# Patient Record
Sex: FEMALE | Race: WHITE | NOT HISPANIC OR LATINO | Employment: FULL TIME | URBAN - METROPOLITAN AREA
[De-identification: names, ages, dates, MRNs, and addresses within clinical notes are randomized per-mention and may not be internally consistent; named-entity substitution may affect disease eponyms.]

---

## 2024-01-26 ASSESSMENT — PROMIS GLOBAL HEALTH SCALE
EMOTIONAL_PROBLEMS: SOMETIMES
RATE_AVERAGE_PAIN: 2
RATE_AVERAGE_FATIGUE: MILD
RATE_PHYSICAL_HEALTH: GOOD
RATE_GENERAL_HEALTH: VERY GOOD
CARRYOUT_SOCIAL_ACTIVITIES: EXCELLENT
CARRYOUT_PHYSICAL_ACTIVITIES: COMPLETELY
RATE_QUALITY_OF_LIFE: VERY GOOD
RATE_MENTAL_HEALTH: VERY GOOD
RATE_SOCIAL_SATISFACTION: VERY GOOD

## 2024-01-31 ENCOUNTER — LAB (OUTPATIENT)
Dept: LAB | Facility: LAB | Age: 39
End: 2024-01-31
Payer: COMMERCIAL

## 2024-01-31 ENCOUNTER — OFFICE VISIT (OUTPATIENT)
Dept: PRIMARY CARE | Facility: CLINIC | Age: 39
End: 2024-01-31
Payer: COMMERCIAL

## 2024-01-31 VITALS
HEIGHT: 67 IN | BODY MASS INDEX: 25.27 KG/M2 | TEMPERATURE: 98.1 F | SYSTOLIC BLOOD PRESSURE: 120 MMHG | WEIGHT: 161 LBS | DIASTOLIC BLOOD PRESSURE: 78 MMHG

## 2024-01-31 DIAGNOSIS — Z23 NEED FOR DIPHTHERIA-TETANUS-PERTUSSIS (TDAP) VACCINE: ICD-10-CM

## 2024-01-31 DIAGNOSIS — R79.89 LOW VITAMIN D LEVEL: ICD-10-CM

## 2024-01-31 DIAGNOSIS — G89.29 CHRONIC PAIN OF RIGHT KNEE: ICD-10-CM

## 2024-01-31 DIAGNOSIS — M76.60 PAIN IN ACHILLES TENDON: ICD-10-CM

## 2024-01-31 DIAGNOSIS — Z13.29 SCREENING FOR THYROID DISORDER: ICD-10-CM

## 2024-01-31 DIAGNOSIS — Z00.00 HEALTHCARE MAINTENANCE: Primary | ICD-10-CM

## 2024-01-31 DIAGNOSIS — Z00.00 HEALTHCARE MAINTENANCE: ICD-10-CM

## 2024-01-31 DIAGNOSIS — M25.561 CHRONIC PAIN OF RIGHT KNEE: ICD-10-CM

## 2024-01-31 PROBLEM — R53.81 MALAISE AND FATIGUE: Status: ACTIVE | Noted: 2024-01-31

## 2024-01-31 PROBLEM — H54.61 VISION LOSS OF RIGHT EYE: Status: ACTIVE | Noted: 2024-01-31

## 2024-01-31 PROBLEM — G45.9 TIA (TRANSIENT ISCHEMIC ATTACK): Status: ACTIVE | Noted: 2024-01-31

## 2024-01-31 PROBLEM — N94.11 SUPERFICIAL DYSPAREUNIA: Status: ACTIVE | Noted: 2023-11-09

## 2024-01-31 PROBLEM — R10.2 PELVIC PAIN IN FEMALE: Status: ACTIVE | Noted: 2023-09-20

## 2024-01-31 PROBLEM — N80.129 ENDOMETRIOMA OF OVARY: Status: ACTIVE | Noted: 2024-01-31

## 2024-01-31 PROBLEM — M62.838 SPASM OF MUSCLE: Status: ACTIVE | Noted: 2023-09-20

## 2024-01-31 PROBLEM — N83.8 OVARIAN MASS, LEFT: Status: ACTIVE | Noted: 2024-01-31

## 2024-01-31 PROBLEM — G45.9 TRANSIENT ISCHEMIC ATTACK: Status: ACTIVE | Noted: 2024-01-31

## 2024-01-31 PROBLEM — M25.50 ARTHRALGIA: Status: ACTIVE | Noted: 2024-01-31

## 2024-01-31 PROBLEM — N83.8 MASS OF LEFT OVARY: Status: ACTIVE | Noted: 2024-01-31

## 2024-01-31 PROBLEM — M25.562 PAIN IN LEFT KNEE: Status: ACTIVE | Noted: 2024-01-31

## 2024-01-31 PROBLEM — N80.9 ENDOMETRIOSIS: Status: ACTIVE | Noted: 2024-01-31

## 2024-01-31 PROBLEM — J32.9 SINUSITIS: Status: ACTIVE | Noted: 2024-01-31

## 2024-01-31 PROBLEM — N80.9 ENDOMETRIOSIS, ADHESIVE: Status: ACTIVE | Noted: 2024-01-31

## 2024-01-31 PROBLEM — R10.9 FLANK PAIN: Status: ACTIVE | Noted: 2024-01-31

## 2024-01-31 PROBLEM — R76.8 POSITIVE ANTINUCLEAR ANTIBODY: Status: ACTIVE | Noted: 2024-01-31

## 2024-01-31 PROBLEM — L80 VITILIGO: Status: ACTIVE | Noted: 2024-01-31

## 2024-01-31 PROBLEM — R53.83 MALAISE AND FATIGUE: Status: ACTIVE | Noted: 2024-01-31

## 2024-01-31 PROBLEM — R11.2 PONV (POSTOPERATIVE NAUSEA AND VOMITING): Status: ACTIVE | Noted: 2023-07-06

## 2024-01-31 PROBLEM — R76.8 ANA POSITIVE: Status: ACTIVE | Noted: 2024-01-31

## 2024-01-31 PROBLEM — H53.9 VISUAL DISTURBANCE: Status: ACTIVE | Noted: 2024-01-31

## 2024-01-31 PROBLEM — R25.2 MUSCLE CRAMPS: Status: ACTIVE | Noted: 2024-01-31

## 2024-01-31 PROBLEM — H54.7 VISUAL IMPAIRMENT: Status: ACTIVE | Noted: 2024-01-31

## 2024-01-31 PROBLEM — Z98.890 PONV (POSTOPERATIVE NAUSEA AND VOMITING): Status: ACTIVE | Noted: 2023-07-06

## 2024-01-31 PROBLEM — G43.109 OCULAR MIGRAINE: Status: ACTIVE | Noted: 2024-01-31

## 2024-01-31 PROBLEM — G43.109 OPHTHALMIC MIGRAINE: Status: ACTIVE | Noted: 2024-01-31

## 2024-01-31 PROBLEM — R10.9 PAIN IN THE ABDOMEN: Status: ACTIVE | Noted: 2024-01-31

## 2024-01-31 LAB
25(OH)D3 SERPL-MCNC: 24 NG/ML (ref 30–100)
ALBUMIN SERPL BCP-MCNC: 4.5 G/DL (ref 3.4–5)
ALP SERPL-CCNC: 46 U/L (ref 33–110)
ALT SERPL W P-5'-P-CCNC: 13 U/L (ref 7–45)
ANION GAP SERPL CALC-SCNC: 13 MMOL/L (ref 10–20)
AST SERPL W P-5'-P-CCNC: 19 U/L (ref 9–39)
BILIRUB SERPL-MCNC: 0.5 MG/DL (ref 0–1.2)
BUN SERPL-MCNC: 14 MG/DL (ref 6–23)
CALCIUM SERPL-MCNC: 9.3 MG/DL (ref 8.6–10.3)
CHLORIDE SERPL-SCNC: 104 MMOL/L (ref 98–107)
CHOLEST SERPL-MCNC: 199 MG/DL (ref 0–199)
CHOLESTEROL/HDL RATIO: 3.3
CO2 SERPL-SCNC: 27 MMOL/L (ref 21–32)
CREAT SERPL-MCNC: 0.76 MG/DL (ref 0.5–1.05)
EGFRCR SERPLBLD CKD-EPI 2021: >90 ML/MIN/1.73M*2
ERYTHROCYTE [DISTWIDTH] IN BLOOD BY AUTOMATED COUNT: 12.1 % (ref 11.5–14.5)
GLUCOSE SERPL-MCNC: 93 MG/DL (ref 74–99)
HCT VFR BLD AUTO: 42.4 % (ref 36–46)
HDLC SERPL-MCNC: 61 MG/DL
HGB BLD-MCNC: 14.1 G/DL (ref 12–16)
LDLC SERPL CALC-MCNC: 112 MG/DL
MCH RBC QN AUTO: 34.4 PG (ref 26–34)
MCHC RBC AUTO-ENTMCNC: 33.3 G/DL (ref 32–36)
MCV RBC AUTO: 103 FL (ref 80–100)
NON HDL CHOLESTEROL: 138 MG/DL (ref 0–149)
NRBC BLD-RTO: 0 /100 WBCS (ref 0–0)
PLATELET # BLD AUTO: 241 X10*3/UL (ref 150–450)
POTASSIUM SERPL-SCNC: 4.3 MMOL/L (ref 3.5–5.3)
PROT SERPL-MCNC: 7.3 G/DL (ref 6.4–8.2)
RBC # BLD AUTO: 4.1 X10*6/UL (ref 4–5.2)
SODIUM SERPL-SCNC: 140 MMOL/L (ref 136–145)
TRIGL SERPL-MCNC: 129 MG/DL (ref 0–149)
TSH SERPL-ACNC: 1.69 MIU/L (ref 0.44–3.98)
VIT B12 SERPL-MCNC: 369 PG/ML (ref 211–911)
VLDL: 26 MG/DL (ref 0–40)
WBC # BLD AUTO: 6.6 X10*3/UL (ref 4.4–11.3)

## 2024-01-31 PROCEDURE — 90715 TDAP VACCINE 7 YRS/> IM: CPT | Performed by: NURSE PRACTITIONER

## 2024-01-31 PROCEDURE — 90471 IMMUNIZATION ADMIN: CPT | Performed by: NURSE PRACTITIONER

## 2024-01-31 PROCEDURE — 1036F TOBACCO NON-USER: CPT | Performed by: NURSE PRACTITIONER

## 2024-01-31 PROCEDURE — 80061 LIPID PANEL: CPT

## 2024-01-31 PROCEDURE — 36415 COLL VENOUS BLD VENIPUNCTURE: CPT

## 2024-01-31 PROCEDURE — 85027 COMPLETE CBC AUTOMATED: CPT

## 2024-01-31 PROCEDURE — 82306 VITAMIN D 25 HYDROXY: CPT

## 2024-01-31 PROCEDURE — 84443 ASSAY THYROID STIM HORMONE: CPT

## 2024-01-31 PROCEDURE — 80053 COMPREHEN METABOLIC PANEL: CPT

## 2024-01-31 PROCEDURE — 82607 VITAMIN B-12: CPT

## 2024-01-31 PROCEDURE — 99396 PREV VISIT EST AGE 40-64: CPT | Performed by: NURSE PRACTITIONER

## 2024-01-31 RX ORDER — NAPROXEN 250 MG/1
250 TABLET ORAL
COMMUNITY

## 2024-01-31 RX ORDER — ACETAMINOPHEN 500 MG
2000 TABLET ORAL DAILY
COMMUNITY

## 2024-01-31 ASSESSMENT — PATIENT HEALTH QUESTIONNAIRE - PHQ9
SUM OF ALL RESPONSES TO PHQ9 QUESTIONS 1 AND 2: 0
1. LITTLE INTEREST OR PLEASURE IN DOING THINGS: NOT AT ALL
2. FEELING DOWN, DEPRESSED OR HOPELESS: NOT AT ALL

## 2024-01-31 NOTE — PATIENT INSTRUCTIONS
Nice to see you today.  Fasting labs and I will follow up.  REFER to [facility]TH[facility] - you can schedule by calling [phone]  Tetanus and pertussis booster.  This is good for 10 years.  Keep up the good work taking care of yourself.

## 2024-01-31 NOTE — PROGRESS NOTES
"Subjective   Patient ID: Lala Nash is a 38 y.o. female who presents for Annual Exam (CPX).    HPI   Here for CPX today  Last summer had left ovary removed & endometriosis surgery    Was a long recovery.  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  Sees GYN regularly    \"Trying to get back on track\"    Bikes four times per week.  Water - not great 3-4 cups per day  Caffeine 1 Energy drink per day  Sleep has been bad for a while    Right knee locked up when getting up from the ground.  2-3 days lots of pain and trouble   Then saw ROSENDO Rivero and never followed up   Still a problem  Also has issue with both Achilles up into calf - only happens at night.  This started a year ago.  No restless leg symptoms.    Does do some stretching.    Thanksgiving 2023  Had COVID  Hx Vitiligo - sees DERM monthly for injections ([facility])    No idea when last tetanus was.    Review of Systems   Musculoskeletal:  Positive for gait problem.   All other systems reviewed and are negative.      Objective   /78   Temp 36.7 °C (98.1 °F)   Ht 1.702 m (5' 7\")   Wt 73 kg (161 lb)   BMI 25.22 kg/m²     Physical Exam  Vitals and nursing note reviewed.   Constitutional:       Appearance: Normal appearance.   HENT:      Head: Normocephalic and atraumatic.      Right Ear: Tympanic membrane, ear canal and external ear normal.      Left Ear: Tympanic membrane, ear canal and external ear normal.      Nose: Nose normal.      Mouth/Throat:      Pharynx: Oropharynx is clear.   Eyes:      Pupils: Pupils are equal, round, and reactive to light.   Neck:      Thyroid: No thyroid mass, thyromegaly or thyroid tenderness.   Cardiovascular:      Rate and Rhythm: Normal rate and regular rhythm.      Pulses: Normal pulses.      Heart sounds: Normal heart sounds.   Pulmonary:      Effort: Pulmonary effort is normal.      Breath sounds: Normal breath sounds.   Abdominal:      General: Bowel sounds are normal.    " "  Palpations: Abdomen is soft.   Genitourinary:     Comments: Defer  Musculoskeletal:         General: Tenderness present.      Cervical back: Normal range of motion and neck supple.      Comments: KAMINI  posterior achilles \"tender\"     Skin:     General: Skin is warm.      Comments: Vitiligo noted on hands, upper extremities, legs.   Neurological:      Mental Status: She is alert and oriented to person, place, and time.   Psychiatric:         Mood and Affect: Mood normal.         Behavior: Behavior normal.         Thought Content: Thought content normal.         Judgment: Judgment normal.         Assessment/Plan   Problem List Items Addressed This Visit             ICD-10-CM    Chronic pain of right knee M25.561, G89.29    Relevant Orders    Referral to Orthopaedic Surgery    Low vitamin D level R79.89    Relevant Orders    Vitamin D 25-Hydroxy,Total (for eval of Vitamin D levels) (Completed)    Need for diphtheria-tetanus-pertussis (Tdap) vaccine Z23    Relevant Orders    Tdap vaccine, age 7 years and older (Completed)    Pain in Achilles tendon M76.60    Relevant Orders    Referral to Orthopaedic Surgery    Vitamin B12 (Completed)    Screening for thyroid disorder - Primary Z13.29    Relevant Orders    TSH with reflex to Free T4 if abnormal (Completed)          "

## 2024-12-16 ENCOUNTER — APPOINTMENT (OUTPATIENT)
Dept: PRIMARY CARE | Facility: CLINIC | Age: 39
End: 2024-12-16
Payer: COMMERCIAL

## 2025-01-22 ENCOUNTER — LAB (OUTPATIENT)
Dept: LAB | Facility: LAB | Age: 40
End: 2025-01-22
Payer: COMMERCIAL

## 2025-01-22 ENCOUNTER — APPOINTMENT (OUTPATIENT)
Dept: PRIMARY CARE | Facility: CLINIC | Age: 40
End: 2025-01-22
Payer: COMMERCIAL

## 2025-01-22 VITALS
HEART RATE: 78 BPM | WEIGHT: 161 LBS | BODY MASS INDEX: 25.27 KG/M2 | TEMPERATURE: 98.1 F | DIASTOLIC BLOOD PRESSURE: 80 MMHG | OXYGEN SATURATION: 98 % | HEIGHT: 67 IN | SYSTOLIC BLOOD PRESSURE: 116 MMHG

## 2025-01-22 DIAGNOSIS — Z12.31 ENCOUNTER FOR SCREENING MAMMOGRAM FOR MALIGNANT NEOPLASM OF BREAST: ICD-10-CM

## 2025-01-22 DIAGNOSIS — Z00.00 HEALTH CARE MAINTENANCE: ICD-10-CM

## 2025-01-22 DIAGNOSIS — Z13.29 SCREENING FOR THYROID DISORDER: ICD-10-CM

## 2025-01-22 DIAGNOSIS — F41.8 SITUATIONAL ANXIETY: ICD-10-CM

## 2025-01-22 DIAGNOSIS — Z13.21 ENCOUNTER FOR VITAMIN DEFICIENCY SCREENING: ICD-10-CM

## 2025-01-22 DIAGNOSIS — Z00.00 HEALTH CARE MAINTENANCE: Primary | ICD-10-CM

## 2025-01-22 DIAGNOSIS — Z13.1 SCREENING FOR DIABETES MELLITUS: ICD-10-CM

## 2025-01-22 LAB
25(OH)D3 SERPL-MCNC: 14 NG/ML (ref 30–100)
ALBUMIN SERPL BCP-MCNC: 4.6 G/DL (ref 3.4–5)
ALP SERPL-CCNC: 50 U/L (ref 33–110)
ALT SERPL W P-5'-P-CCNC: 12 U/L (ref 7–45)
ANION GAP SERPL CALC-SCNC: 9 MMOL/L (ref 10–20)
AST SERPL W P-5'-P-CCNC: 16 U/L (ref 9–39)
BILIRUB SERPL-MCNC: 0.4 MG/DL (ref 0–1.2)
BUN SERPL-MCNC: 14 MG/DL (ref 6–23)
CALCIUM SERPL-MCNC: 9.4 MG/DL (ref 8.6–10.3)
CHLORIDE SERPL-SCNC: 106 MMOL/L (ref 98–107)
CHOLEST SERPL-MCNC: 212 MG/DL (ref 0–199)
CHOLESTEROL/HDL RATIO: 4
CO2 SERPL-SCNC: 29 MMOL/L (ref 21–32)
CREAT SERPL-MCNC: 0.83 MG/DL (ref 0.5–1.05)
EGFRCR SERPLBLD CKD-EPI 2021: >90 ML/MIN/1.73M*2
ERYTHROCYTE [DISTWIDTH] IN BLOOD BY AUTOMATED COUNT: 12 % (ref 11.5–14.5)
EST. AVERAGE GLUCOSE BLD GHB EST-MCNC: 94 MG/DL
GLUCOSE SERPL-MCNC: 100 MG/DL (ref 74–99)
HBA1C MFR BLD: 4.9 %
HCT VFR BLD AUTO: 41.9 % (ref 36–46)
HDLC SERPL-MCNC: 53 MG/DL
HGB BLD-MCNC: 13.7 G/DL (ref 12–16)
LDLC SERPL CALC-MCNC: 141 MG/DL
MCH RBC QN AUTO: 32.9 PG (ref 26–34)
MCHC RBC AUTO-ENTMCNC: 32.7 G/DL (ref 32–36)
MCV RBC AUTO: 101 FL (ref 80–100)
NON HDL CHOLESTEROL: 159 MG/DL (ref 0–149)
NRBC BLD-RTO: 0 /100 WBCS (ref 0–0)
PLATELET # BLD AUTO: 244 X10*3/UL (ref 150–450)
POTASSIUM SERPL-SCNC: 4.2 MMOL/L (ref 3.5–5.3)
PROT SERPL-MCNC: 7 G/DL (ref 6.4–8.2)
RBC # BLD AUTO: 4.17 X10*6/UL (ref 4–5.2)
SODIUM SERPL-SCNC: 140 MMOL/L (ref 136–145)
TRIGL SERPL-MCNC: 89 MG/DL (ref 0–149)
TSH SERPL-ACNC: 1.35 MIU/L (ref 0.44–3.98)
VLDL: 18 MG/DL (ref 0–40)
WBC # BLD AUTO: 4.8 X10*3/UL (ref 4.4–11.3)

## 2025-01-22 PROCEDURE — 83036 HEMOGLOBIN GLYCOSYLATED A1C: CPT

## 2025-01-22 PROCEDURE — 80061 LIPID PANEL: CPT

## 2025-01-22 PROCEDURE — 84443 ASSAY THYROID STIM HORMONE: CPT

## 2025-01-22 PROCEDURE — 3008F BODY MASS INDEX DOCD: CPT | Performed by: NURSE PRACTITIONER

## 2025-01-22 PROCEDURE — 99213 OFFICE O/P EST LOW 20 MIN: CPT | Performed by: NURSE PRACTITIONER

## 2025-01-22 PROCEDURE — 82306 VITAMIN D 25 HYDROXY: CPT

## 2025-01-22 PROCEDURE — 85027 COMPLETE CBC AUTOMATED: CPT

## 2025-01-22 PROCEDURE — 99396 PREV VISIT EST AGE 40-64: CPT | Performed by: NURSE PRACTITIONER

## 2025-01-22 PROCEDURE — 80053 COMPREHEN METABOLIC PANEL: CPT

## 2025-01-22 RX ORDER — DROSPIRENONE 4 MG/1
4 TABLET, FILM COATED ORAL
COMMUNITY
Start: 2024-10-16

## 2025-01-22 RX ORDER — ESTRADIOL 0.1 MG/G
0.1 CREAM VAGINAL 2 TIMES WEEKLY
COMMUNITY
Start: 2024-10-16

## 2025-01-22 RX ORDER — BUSPIRONE HYDROCHLORIDE 5 MG/1
5 TABLET ORAL 2 TIMES DAILY PRN
Qty: 60 TABLET | Refills: 0 | Status: SHIPPED | OUTPATIENT
Start: 2025-01-22 | End: 2026-01-22

## 2025-01-22 RX ORDER — RUXOLITINIB 15 MG/G
1 CREAM TOPICAL 2 TIMES DAILY
COMMUNITY
Start: 2024-10-14

## 2025-01-22 ASSESSMENT — PATIENT HEALTH QUESTIONNAIRE - PHQ9
2. FEELING DOWN, DEPRESSED OR HOPELESS: NOT AT ALL
1. LITTLE INTEREST OR PLEASURE IN DOING THINGS: NOT AT ALL
SUM OF ALL RESPONSES TO PHQ9 QUESTIONS 1 AND 2: 0

## 2025-01-22 NOTE — PROGRESS NOTES
"Subjective   Patient ID: Lala Nash is a 39 y.o. female who presents for Annual Exam (CPX).    HPI       Has been getting some anxiety.  Thought it was from birth control.Has been travelling a fair amount.  Sometimes grocery store can trigger.    Years ago, had it a lot.  Diarrhea would   Was on a progesterone only  in the past  Changed to Slynd (synthetic)    Had ovarian cyst that took her to ER   [place] like UTI   Weights consistently  20-30 miles of cycling  Has a  also   Stupid situations    DERM  Yasmine      Review of Systems    Objective   /80   Pulse 78   Temp 36.7 °C (98.1 °F)   Ht 1.702 m (5' 7\")   Wt 73 kg (161 lb)   SpO2 98%   BMI 25.22 kg/m²     Physical Exam    Assessment/Plan   {Assess/PlanSmartLinks:19591}       "   Abdominal:      General: Bowel sounds are normal.      Palpations: Abdomen is soft.   Genitourinary:     Comments: Deferred  Musculoskeletal:         General: Normal range of motion.      Cervical back: Normal range of motion and neck supple.   Skin:     General: Skin is warm.      Capillary Refill: Capillary refill takes 2 to 3 seconds.   Neurological:      Mental Status: She is alert and oriented to person, place, and time. Mental status is at baseline.   Psychiatric:         Behavior: Behavior normal.         Thought Content: Thought content normal.         Judgment: Judgment normal.      Comments: Good eye contact, pleasant mildly anxious         Assessment/Plan   Assessment & Plan  Health care maintenance    Orders:    CBC; Future    Comprehensive Metabolic Panel; Future    Lipid Panel; Future    Screening for diabetes mellitus    Orders:    Hemoglobin A1C; Future    Screening for thyroid disorder    Orders:    TSH with reflex to Free T4 if abnormal; Future    Encounter for vitamin deficiency screening    Orders:    Vitamin D 25-Hydroxy,Total (for eval of Vitamin D levels); Future    Encounter for screening mammogram for malignant neoplasm of breast    Orders:    BI mammo bilateral screening tomosynthesis; Future    Situational anxiety    Orders:    busPIRone (Buspar) 5 mg tablet; Take 1 tablet (5 mg) by mouth 2 times a day as needed (situational anxiety).

## 2025-01-22 NOTE — PATIENT INSTRUCTIONS
Good to see you today.  Fasting labs and I will follow up   Mammogram - [facility]  Radiology [phone]   Try the MiraLax every day  - increase the dose until you are comfortably having soft formed stool daily.  Stay well hydrated ~ 50 ounces of water per day.  Trial of Buspirone 1/2 -1 twice daily as needed situational anxiety.

## 2025-02-06 ENCOUNTER — APPOINTMENT (OUTPATIENT)
Dept: RADIOLOGY | Facility: CLINIC | Age: 40
End: 2025-02-06
Payer: COMMERCIAL

## 2025-03-03 PROBLEM — Z13.21 ENCOUNTER FOR VITAMIN DEFICIENCY SCREENING: Status: ACTIVE | Noted: 2024-01-31

## 2025-03-03 PROBLEM — Z12.31 ENCOUNTER FOR SCREENING MAMMOGRAM FOR MALIGNANT NEOPLASM OF BREAST: Status: ACTIVE | Noted: 2024-01-31

## 2025-03-03 PROBLEM — Z13.1 SCREENING FOR DIABETES MELLITUS: Status: ACTIVE | Noted: 2024-01-31

## 2025-03-03 PROBLEM — F41.8 SITUATIONAL ANXIETY: Status: ACTIVE | Noted: 2025-03-03

## 2025-03-03 ASSESSMENT — ENCOUNTER SYMPTOMS
NERVOUS/ANXIOUS: 1
SLEEP DISTURBANCE: 0
DYSPHORIC MOOD: 0
ACTIVITY CHANGE: 1
UNEXPECTED WEIGHT CHANGE: 1
DIARRHEA: 1
ABDOMINAL PAIN: 1
PALPITATIONS: 0

## 2025-03-03 NOTE — ASSESSMENT & PLAN NOTE
Orders:    busPIRone (Buspar) 5 mg tablet; Take 1 tablet (5 mg) by mouth 2 times a day as needed (situational anxiety).

## 2025-07-14 ENCOUNTER — APPOINTMENT (OUTPATIENT)
Dept: PRIMARY CARE | Facility: CLINIC | Age: 40
End: 2025-07-14
Payer: COMMERCIAL

## 2025-07-14 VITALS
SYSTOLIC BLOOD PRESSURE: 122 MMHG | BODY MASS INDEX: 24.8 KG/M2 | HEIGHT: 67 IN | DIASTOLIC BLOOD PRESSURE: 78 MMHG | TEMPERATURE: 97.7 F | WEIGHT: 158 LBS

## 2025-07-14 DIAGNOSIS — M25.50 PAIN IN JOINT INVOLVING MULTIPLE SITES: Primary | ICD-10-CM

## 2025-07-14 DIAGNOSIS — R30.0 DYSURIA: ICD-10-CM

## 2025-07-14 DIAGNOSIS — R25.2 MUSCLE CRAMPS: ICD-10-CM

## 2025-07-14 DIAGNOSIS — R76.8 POSITIVE ANTINUCLEAR ANTIBODY: ICD-10-CM

## 2025-07-14 LAB
POC APPEARANCE, URINE: CLEAR
POC BILIRUBIN, URINE: NEGATIVE
POC BLOOD, URINE: NEGATIVE
POC COLOR, URINE: YELLOW
POC GLUCOSE, URINE: NEGATIVE MG/DL
POC KETONES, URINE: NEGATIVE MG/DL
POC LEUKOCYTES, URINE: NEGATIVE
POC NITRITE,URINE: NEGATIVE
POC PH, URINE: 6 PH
POC PROTEIN, URINE: ABNORMAL MG/DL
POC SPECIFIC GRAVITY, URINE: >=1.03
POC UROBILINOGEN, URINE: 0.2 EU/DL

## 2025-07-14 PROCEDURE — 3008F BODY MASS INDEX DOCD: CPT | Performed by: NURSE PRACTITIONER

## 2025-07-14 PROCEDURE — 81003 URINALYSIS AUTO W/O SCOPE: CPT | Performed by: NURSE PRACTITIONER

## 2025-07-14 PROCEDURE — 99214 OFFICE O/P EST MOD 30 MIN: CPT | Performed by: NURSE PRACTITIONER

## 2025-07-14 RX ORDER — METHYLPREDNISOLONE 4 MG/1
TABLET ORAL
Qty: 21 TABLET | Refills: 0 | Status: SHIPPED | OUTPATIENT
Start: 2025-07-14 | End: 2025-07-20

## 2025-07-14 NOTE — PROGRESS NOTES
Subjective   Patient ID: Lala Nash is a 40 y.o. female who presents for joint paint (Coming from both ankles, ankle pain x's 2-3 months).  History of Present Illness  Lala Nash is a 40 year old female who presents with persistent bilateral ankle pain radiating to the calves, knees, and hamstrings.    She has been experiencing persistent aching pain in both ankles that radiates upwards to her calves, knees, and hamstrings for several months, with a significant increase in intensity over the past two months. The pain is described as 'nonstop' and is particularly severe at night and upon waking, making ambulation difficult. No swelling, trauma, or pain in the feet is noted.    She has increased her bike riding since May but does not attribute this to her symptoms. Attempts to alleviate the pain include increasing amino drinks, stretching, using a foam roller, and wearing supportive tennis shoes, none of which have provided relief. She consumes about 32 ounces of water daily and has noted an increase in alcohol consumption.    She was on Macrobid for a suspected UTI a month or two ago but has not taken any quinolone antibiotics. She has a history of kidney stones but declined imaging as she does not believe she currently has one. She experienced hematuria on one occasion.    Her current medications include daily Vitamin D, Tylenol PM, and occasional Buspirone. She also takes magnesium twice a week to aid with sleep, though her sleep is disrupted due to the pain.    In the past, she had a positive CHRISSIE test with a titer of 1:160 and has seen a rheumatologist. She has not undergone recent physical therapy but is working with a  who is aware of her symptoms and has observed tightness in her Achilles tendons during squats.    Pain has been non-Stop and is worse at night  Has been stretching and doing foam roller.  No trauma  No swelling  Feet do not hurt  Maybe a little more beer than usual:  5-6-7-8 per week  Also  "admits to a lot more stress with sister (s/p TBI) and a strain to that relationship as well as her aging parents.      Review of Systems   Constitutional:  Positive for activity change.   Cardiovascular:  Negative for chest pain, palpitations and leg swelling.   Musculoskeletal:  Positive for arthralgias, gait problem and myalgias.   Skin:  Negative for color change and rash.   Psychiatric/Behavioral:  The patient is nervous/anxious.        Physical Exam  Vitals and nursing note reviewed.   Constitutional:       Appearance: Normal appearance.   HENT:      Head: Normocephalic and atraumatic.      Mouth/Throat:      Mouth: Mucous membranes are moist.   Cardiovascular:      Rate and Rhythm: Normal rate and regular rhythm.      Pulses: Normal pulses.      Heart sounds: Normal heart sounds.   Pulmonary:      Effort: Pulmonary effort is normal.      Breath sounds: Normal breath sounds.   Musculoskeletal:         General: Tenderness present. No swelling or signs of injury.      Right lower leg: No edema.      Left lower leg: No edema.      Comments: PAIN at both heels and achilles tendons  Pain up posterior aspect of legs   Neurological:      Mental Status: She is alert and oriented to person, place, and time.   Psychiatric:         Behavior: Behavior normal.         Thought Content: Thought content normal.         Judgment: Judgment normal.      Comments: Anxious/worried about legs and also about situation with sister.           Results  LABS    CHRISSIE: 1:160 (01/2023)       Objective     /78   Temp 36.5 °C (97.7 °F)   Ht 1.702 m (5' 7\")   Wt 71.7 kg (158 lb)   BMI 24.75 kg/m²      Assessment & Plan  Bilateral lower extremity pain  Persistent aching pain in the ankles radiating to calves, knees, and hamstrings for two months, worse at night and upon waking. No swelling or trauma. Differential includes metabolic derangement, gout, or systemic inflammation. Previous positive CHRISSIE and rheumatology consultation. " Increased alcohol consumption and inadequate hydration may contribute.  - Order labs for uric acid, inflammatory markers, electrolytes, anemia, and thyroid function  - Prescribe Medrol Dosepak for potential gout or systemic inflammation, cautioning about sleep disturbance  - Advise increased water intake  - Recommend reducing alcohol consumption  - Consider physical therapy if symptoms persist after initial treatment    Mental health and stress management  Significant stress from family dynamics, including a sister with TBI and a mother with dementia. Therapy at [facility] scheduled. Increased alcohol consumption as a coping mechanism.  - Encourage continuation of therapy sessions for stress management  - Advise reducing alcohol consumption as a coping mechanism    General Health Maintenance  Taking vitamin D and magnesium supplements. Engaged in physical activity with a , mindful of physical limitations. Magnesium taken twice weekly for sleep aid.  - Continue vitamin D and magnesium supplementation  - Maintain current physical activity with  guidance    Recording duration: 23 minutes    Assessment & Plan  Pain in joint involving multiple sites         Muscle cramps    Orders:    Arthritis Panel (CMS); Future    CBC and Auto Differential; Future    Comprehensive metabolic panel; Future    TSH with reflex to Free T4 if abnormal; Future    Magnesium; Future    methylPREDNISolone (Medrol Dospak) 4 mg tablets; Take as directed on package.    POCT UA Automated manually resulted    Dysuria         Positive antinuclear antibody              DINO Parker-CNP     This medical note was created with the assistance of artificial intelligence (AI) for documentation purposes. The content has been reviewed and confirmed by the healthcare provider for accuracy and completeness. Patient consented to the use of audio recording and use of AI during their visit.

## 2025-07-15 ASSESSMENT — ENCOUNTER SYMPTOMS
NERVOUS/ANXIOUS: 1
MYALGIAS: 1
ARTHRALGIAS: 1
COLOR CHANGE: 0
PALPITATIONS: 0
ACTIVITY CHANGE: 1

## 2025-07-16 LAB
ALBUMIN SERPL-MCNC: 4.9 G/DL (ref 3.6–5.1)
ALP SERPL-CCNC: 52 U/L (ref 31–125)
ALT SERPL-CCNC: 15 U/L (ref 6–29)
ANA PAT SER IF-IMP: ABNORMAL
ANA PAT SER IF-IMP: ABNORMAL
ANA SER QL IF: POSITIVE
ANA TITR SER IF: ABNORMAL TITER
ANA TITR SER IF: ABNORMAL TITER
ANION GAP SERPL CALCULATED.4IONS-SCNC: 8 MMOL/L (CALC) (ref 7–17)
AST SERPL-CCNC: 19 U/L (ref 10–30)
BASOPHILS # BLD AUTO: 39 CELLS/UL (ref 0–200)
BASOPHILS NFR BLD AUTO: 0.7 %
BILIRUB SERPL-MCNC: 0.6 MG/DL (ref 0.2–1.2)
BUN SERPL-MCNC: 11 MG/DL (ref 7–25)
CALCIUM SERPL-MCNC: 9.7 MG/DL (ref 8.6–10.2)
CENTROMERE B AB SER-ACNC: ABNORMAL AI
CHLORIDE SERPL-SCNC: 103 MMOL/L (ref 98–110)
CO2 SERPL-SCNC: 29 MMOL/L (ref 20–32)
CREAT SERPL-MCNC: 0.79 MG/DL (ref 0.5–0.99)
DSDNA AB SER-ACNC: <1 IU/ML
EGFRCR SERPLBLD CKD-EPI 2021: 96 ML/MIN/1.73M2
ENA JO1 AB SER IA-ACNC: ABNORMAL AI
ENA RNP AB SER-ACNC: ABNORMAL AI
ENA SCL70 AB SER IA-ACNC: ABNORMAL AI
ENA SM AB SER IA-ACNC: ABNORMAL AI
ENA SM+RNP AB SER IA-ACNC: ABNORMAL AI
ENA SS-A AB SER IA-ACNC: ABNORMAL AI
ENA SS-B AB SER IA-ACNC: ABNORMAL AI
EOSINOPHIL # BLD AUTO: 101 CELLS/UL (ref 15–500)
EOSINOPHIL NFR BLD AUTO: 1.8 %
ERYTHROCYTE [DISTWIDTH] IN BLOOD BY AUTOMATED COUNT: 11.8 % (ref 11–15)
ERYTHROCYTE [SEDIMENTATION RATE] IN BLOOD BY WESTERGREN METHOD: 6 MM/H
GLUCOSE SERPL-MCNC: 99 MG/DL (ref 65–139)
HCT VFR BLD AUTO: 42.6 % (ref 35–45)
HGB BLD-MCNC: 14.2 G/DL (ref 11.7–15.5)
LABORATORY COMMENT REPORT: ABNORMAL
LYMPHOCYTES # BLD AUTO: 1154 CELLS/UL (ref 850–3900)
LYMPHOCYTES NFR BLD AUTO: 20.6 %
MAGNESIUM SERPL-MCNC: 2.3 MG/DL (ref 1.5–2.5)
MCH RBC QN AUTO: 33.8 PG (ref 27–33)
MCHC RBC AUTO-ENTMCNC: 33.3 G/DL (ref 32–36)
MCV RBC AUTO: 101.4 FL (ref 80–100)
MONOCYTES # BLD AUTO: 493 CELLS/UL (ref 200–950)
MONOCYTES NFR BLD AUTO: 8.8 %
NEUTROPHILS # BLD AUTO: 3814 CELLS/UL (ref 1500–7800)
NEUTROPHILS NFR BLD AUTO: 68.1 %
NUCLEOSOME AB SER IA-ACNC: ABNORMAL AI
PLATELET # BLD AUTO: 232 THOUSAND/UL (ref 140–400)
PMV BLD REES-ECKER: 11.1 FL (ref 7.5–12.5)
POTASSIUM SERPL-SCNC: 4.1 MMOL/L (ref 3.5–5.3)
PROT SERPL-MCNC: 7.5 G/DL (ref 6.1–8.1)
RBC # BLD AUTO: 4.2 MILLION/UL (ref 3.8–5.1)
RHEUMATOID FACT SERPL-ACNC: <10 IU/ML
RIBOSOMAL P AB SER-ACNC: ABNORMAL AI
SODIUM SERPL-SCNC: 140 MMOL/L (ref 135–146)
TSH SERPL-ACNC: 1.97 MIU/L
URATE SERPL-MCNC: 5.7 MG/DL (ref 2.5–7)
WBC # BLD AUTO: 5.6 THOUSAND/UL (ref 3.8–10.8)

## 2025-07-31 NOTE — ASSESSMENT & PLAN NOTE
Orders:    Arthritis Panel (CMS); Future    CBC and Auto Differential; Future    Comprehensive metabolic panel; Future    TSH with reflex to Free T4 if abnormal; Future    Magnesium; Future    methylPREDNISolone (Medrol Dospak) 4 mg tablets; Take as directed on package.    POCT UA Automated manually resulted

## 2025-07-31 NOTE — PATIENT INSTRUCTIONS
VISIT SUMMARY:  During your visit, we discussed your persistent bilateral ankle pain that radiates to your calves, knees, and hamstrings, which has been worsening over the past two months. We also addressed your mental health and stress management, as well as your general health maintenance.    YOUR PLAN:  -BILATERAL LOWER EXTREMITY PAIN: You have been experiencing persistent pain in your ankles that radiates to your calves, knees, and hamstrings. This could be due to metabolic issues, gout, or systemic inflammation. We will run some lab tests to check for uric acid, inflammation, electrolytes, anemia, and thyroid function. You have been prescribed a Medrol Dosepak to help with potential gout or inflammation, but be aware it may affect your sleep. Please increase your water intake and try to reduce alcohol consumption. If the pain continues, we may consider physical therapy.    -MENTAL HEALTH AND STRESS MANAGEMENT: You are dealing with significant stress due to family dynamics, including caring for a sister with a traumatic brain injury and a mother with dementia. You have therapy sessions scheduled at [facility], which is great. Please continue with these sessions and try to reduce alcohol consumption as a way to cope with stress.    -GENERAL HEALTH MAINTENANCE: You are taking vitamin D and magnesium supplements and staying active with the guidance of a . Please continue with these supplements and maintain your current level of physical activity, being mindful of your physical limitations.    INSTRUCTIONS:  Please follow up after completing the lab tests and the Medrol Dosepak to discuss the results and next steps. If your pain persists, we may need to consider physical therapy. Continue with your therapy sessions at [facility] and try to reduce alcohol consumption. Increase your water intake to stay well-hydrated.

## 2025-08-05 DIAGNOSIS — Z12.31 ENCOUNTER FOR SCREENING MAMMOGRAM FOR BREAST CANCER: ICD-10-CM

## 2025-08-25 ENCOUNTER — APPOINTMENT (OUTPATIENT)
Dept: RADIOLOGY | Facility: CLINIC | Age: 40
End: 2025-08-25
Payer: COMMERCIAL

## 2025-08-25 DIAGNOSIS — Z12.31 ENCOUNTER FOR SCREENING MAMMOGRAM FOR BREAST CANCER: ICD-10-CM

## 2025-08-25 PROCEDURE — 77067 SCR MAMMO BI INCL CAD: CPT | Performed by: STUDENT IN AN ORGANIZED HEALTH CARE EDUCATION/TRAINING PROGRAM

## 2025-08-25 PROCEDURE — 77063 BREAST TOMOSYNTHESIS BI: CPT | Performed by: STUDENT IN AN ORGANIZED HEALTH CARE EDUCATION/TRAINING PROGRAM

## 2025-08-25 PROCEDURE — 77067 SCR MAMMO BI INCL CAD: CPT
